# Patient Record
Sex: FEMALE | Race: BLACK OR AFRICAN AMERICAN | NOT HISPANIC OR LATINO | Employment: FULL TIME | URBAN - METROPOLITAN AREA
[De-identification: names, ages, dates, MRNs, and addresses within clinical notes are randomized per-mention and may not be internally consistent; named-entity substitution may affect disease eponyms.]

---

## 2017-12-21 ENCOUNTER — HISTORICAL (OUTPATIENT)
Dept: ADMINISTRATIVE | Facility: HOSPITAL | Age: 32
End: 2017-12-21

## 2017-12-24 LAB — FINAL CULTURE: NORMAL

## 2019-08-16 ENCOUNTER — HISTORICAL (OUTPATIENT)
Dept: RADIOLOGY | Facility: HOSPITAL | Age: 34
End: 2019-08-16

## 2020-07-09 ENCOUNTER — HISTORICAL (OUTPATIENT)
Dept: ADMINISTRATIVE | Facility: HOSPITAL | Age: 35
End: 2020-07-09

## 2021-04-20 LAB — POC BETA-HCG (QUAL): NEGATIVE

## 2022-04-10 ENCOUNTER — HISTORICAL (OUTPATIENT)
Dept: ADMINISTRATIVE | Facility: HOSPITAL | Age: 37
End: 2022-04-10

## 2022-04-11 ENCOUNTER — HISTORICAL (OUTPATIENT)
Dept: ADMINISTRATIVE | Facility: HOSPITAL | Age: 37
End: 2022-04-11

## 2022-04-28 VITALS
SYSTOLIC BLOOD PRESSURE: 122 MMHG | DIASTOLIC BLOOD PRESSURE: 75 MMHG | BODY MASS INDEX: 40.73 KG/M2 | WEIGHT: 176 LBS | HEIGHT: 55 IN

## 2022-04-28 VITALS
WEIGHT: 176 LBS | HEIGHT: 55 IN | DIASTOLIC BLOOD PRESSURE: 75 MMHG | BODY MASS INDEX: 40.73 KG/M2 | SYSTOLIC BLOOD PRESSURE: 122 MMHG

## 2022-09-21 ENCOUNTER — HISTORICAL (OUTPATIENT)
Dept: ADMINISTRATIVE | Facility: HOSPITAL | Age: 37
End: 2022-09-21

## 2023-10-02 DIAGNOSIS — N39.41 URINARY INCONTINENCE, URGE: Primary | ICD-10-CM

## 2023-10-16 ENCOUNTER — OFFICE VISIT (OUTPATIENT)
Dept: UROLOGY | Facility: CLINIC | Age: 38
End: 2023-10-16
Payer: MEDICAID

## 2023-10-16 VITALS
HEART RATE: 80 BPM | HEIGHT: 66 IN | DIASTOLIC BLOOD PRESSURE: 84 MMHG | OXYGEN SATURATION: 100 % | TEMPERATURE: 99 F | WEIGHT: 195 LBS | SYSTOLIC BLOOD PRESSURE: 120 MMHG | RESPIRATION RATE: 20 BRPM | BODY MASS INDEX: 31.34 KG/M2

## 2023-10-16 DIAGNOSIS — N39.41 URINARY INCONTINENCE, URGE: ICD-10-CM

## 2023-10-16 DIAGNOSIS — N39.3 PRIMARY STRESS URINARY INCONTINENCE: ICD-10-CM

## 2023-10-16 DIAGNOSIS — R82.90 ABNORMAL URINALYSIS: Primary | ICD-10-CM

## 2023-10-16 LAB
BILIRUB SERPL-MCNC: NEGATIVE MG/DL
BLOOD URINE, POC: NORMAL
COLOR, POC UA: YELLOW
GLUCOSE UR QL STRIP: NEGATIVE
KETONES UR QL STRIP: NEGATIVE
LEUKOCYTE ESTERASE URINE, POC: NORMAL
NITRITE, POC UA: NEGATIVE
PH, POC UA: 6
POC RESIDUAL URINE VOLUME: 0 ML (ref 0–100)
PROTEIN, POC: NEGATIVE
SPECIFIC GRAVITY, POC UA: 1.03
UROBILINOGEN, POC UA: 0.2

## 2023-10-16 PROCEDURE — 99215 OFFICE O/P EST HI 40 MIN: CPT | Mod: PBBFAC | Performed by: NURSE PRACTITIONER

## 2023-10-16 PROCEDURE — 99204 PR OFFICE/OUTPT VISIT, NEW, LEVL IV, 45-59 MIN: ICD-10-PCS | Mod: S$PBB,,, | Performed by: NURSE PRACTITIONER

## 2023-10-16 PROCEDURE — 1160F RVW MEDS BY RX/DR IN RCRD: CPT | Mod: CPTII,,, | Performed by: NURSE PRACTITIONER

## 2023-10-16 PROCEDURE — 51798 US URINE CAPACITY MEASURE: CPT | Mod: PBBFAC | Performed by: NURSE PRACTITIONER

## 2023-10-16 PROCEDURE — 3079F PR MOST RECENT DIASTOLIC BLOOD PRESSURE 80-89 MM HG: ICD-10-PCS | Mod: CPTII,,, | Performed by: NURSE PRACTITIONER

## 2023-10-16 PROCEDURE — 1159F PR MEDICATION LIST DOCUMENTED IN MEDICAL RECORD: ICD-10-PCS | Mod: CPTII,,, | Performed by: NURSE PRACTITIONER

## 2023-10-16 PROCEDURE — 1159F MED LIST DOCD IN RCRD: CPT | Mod: CPTII,,, | Performed by: NURSE PRACTITIONER

## 2023-10-16 PROCEDURE — 3079F DIAST BP 80-89 MM HG: CPT | Mod: CPTII,,, | Performed by: NURSE PRACTITIONER

## 2023-10-16 PROCEDURE — 3008F PR BODY MASS INDEX (BMI) DOCUMENTED: ICD-10-PCS | Mod: CPTII,,, | Performed by: NURSE PRACTITIONER

## 2023-10-16 PROCEDURE — 3074F SYST BP LT 130 MM HG: CPT | Mod: CPTII,,, | Performed by: NURSE PRACTITIONER

## 2023-10-16 PROCEDURE — 3008F BODY MASS INDEX DOCD: CPT | Mod: CPTII,,, | Performed by: NURSE PRACTITIONER

## 2023-10-16 PROCEDURE — 99204 OFFICE O/P NEW MOD 45 MIN: CPT | Mod: S$PBB,,, | Performed by: NURSE PRACTITIONER

## 2023-10-16 PROCEDURE — 3074F PR MOST RECENT SYSTOLIC BLOOD PRESSURE < 130 MM HG: ICD-10-PCS | Mod: CPTII,,, | Performed by: NURSE PRACTITIONER

## 2023-10-16 PROCEDURE — 1160F PR REVIEW ALL MEDS BY PRESCRIBER/CLIN PHARMACIST DOCUMENTED: ICD-10-PCS | Mod: CPTII,,, | Performed by: NURSE PRACTITIONER

## 2023-10-16 PROCEDURE — 87088 URINE BACTERIA CULTURE: CPT | Performed by: NURSE PRACTITIONER

## 2023-10-16 PROCEDURE — 81001 URINALYSIS AUTO W/SCOPE: CPT | Mod: PBBFAC | Performed by: NURSE PRACTITIONER

## 2023-10-16 RX ORDER — IMIPRAMINE HYDROCHLORIDE 25 MG/1
25 TABLET, FILM COATED ORAL NIGHTLY
Qty: 90 TABLET | Refills: 3 | Status: SHIPPED | OUTPATIENT
Start: 2023-10-16 | End: 2024-10-15

## 2023-10-16 NOTE — PROGRESS NOTES
Placed in room. Seen by Alexandre Mcmahan NP. Spoke with patient. Bladder scan at 0 ml.  F/U in a month.

## 2023-10-16 NOTE — PROGRESS NOTES
Chief Complaint:   Chief Complaint   Patient presents with    urinary incontinece       HPI:  Patient is a 38-year-old female referred to Urology for urinary incontinence.  Patient was started on oxybutynin ER 10 mg daily for urinary incontinence by PCP, she felt it did not work.  Patient having frequent episodes of urinary incontinence associated with coughing, laughing, straining she is going through approximately 3 mild soiled pads per day.  Patient states she does not drink more than 30-40 oz of fluid a day due to less under episodes of incontinence patient feels she gets frequent urinary tract infections due to her highly concentrated urine and lack of water intake.  Today patient denies any dysuria, urinary retention, gross hematuria.  Bladder scan 0 mL.    Allergies:  Review of patient's allergies indicates:   Allergen Reactions    Penicillins Anaphylaxis, Hives and Shortness Of Breath    Iodine     Iodine and iodide containing products     Shellfish derived        Medications:  Current Outpatient Medications   Medication Sig Dispense Refill    albuterol (PROVENTIL/VENTOLIN HFA) 90 mcg/actuation inhaler albuterol sulfate HFA 90 mcg/actuation aerosol inhaler   INHALE 1 PUFF INTO THE LUNGS EVERY 4 TO 6 HOURS AS NEEDED FOR SHORTNESS OF BREATH OR WHEEZING      azelastine (ASTELIN) 137 mcg (0.1 %) nasal spray azelastine 137 mcg (0.1 %) nasal spray aerosol      cholecalciferol, vitamin D3, (DECARA) 1,250 mcg (50,000 unit) capsule Decara 1,250 mcg (50,000 unit) capsule   TAKE 1 SOFTGEL BY MOUTH ONCE A WEEK      crisaborole (EUCRISA) 2 % Oint Eucrisa 2 % topical ointment   APPLY THIN COAT TO AFFECTED AREA TWICE A DAY      diclofenac sodium (VOLTAREN) 1 % Gel diclofenac 1 % topical gel   APPLY TO AFFECTED AREA AS NEEDED FOR PAIN 30      EPINEPHrine (EPIPEN) 0.3 mg/0.3 mL AtIn epinephrine 0.3 mg/0.3 mL injection, auto-injector   INJECT 1 SYRINGE INTO LATERAL THIGH AT ONSET OF ANAPHYLAXIS      fluticasone propionate  (FLONASE) 50 mcg/actuation nasal spray fluticasone propionate 50 mcg/actuation nasal spray,suspension   USE 1 SPRAY IN EACH NOSTRIL ONCE DAILY      fluticasone propionate (FLOVENT HFA) 44 mcg/actuation inhaler Flovent HFA 44 mcg/actuation aerosol inhaler   INHALE 2 PUFFS TWICE A DAY      levocetirizine (XYZAL) 5 MG tablet levocetirizine 5 mg tablet   TAKE 1 TABLET BY MOUTH AT BEDTIME FOR ALLERGIES 30      mometasone 0.1% (ELOCON) 0.1 % cream mometasone 0.1 % topical cream   APPLY SPARINGLY TO AFFECTED AREA EVERY DAY      montelukast (SINGULAIR) 10 mg tablet montelukast 10 mg tablet   TAKE 1 TABLET BY MOUTH EVERY EVENING      omeprazole (PRILOSEC) 40 MG capsule omeprazole 40 mg capsule,delayed release      triamcinolone acetonide 0.1% (KENALOG) 0.1 % cream triamcinolone acetonide 0.1 % topical cream   APPLY THIN COAT TO AFFECTED AREA TWICE A DAY      oxybutynin (DITROPAN-XL) 10 MG 24 hr tablet oxybutynin chloride ER 10 mg tablet,extended release 24 hr   TAKE 1 TABLET BY MOUTH EVERY DAY       No current facility-administered medications for this visit.       Review of Systems:  General: No fever, chills, fatigability, or weight loss.  Skin: No rashes, itching, or changes in color or texture of skin.  Chest: Denies LIZARRAGA, cyanosis, wheezing, cough, and sputum production.  Abdomen: Appetite fine. No weight loss. Denies diarrhea, abdominal pain, hematemesis, or blood in stool.  Musculoskeletal: No joint stiffness or swelling. Denies back pain.  : As above.  All other review of systems negative.    PMH:  Past Medical History:   Diagnosis Date    Allergic rhinitis     Asthma     Excessive cerumen in ear canal     Hearing loss in left ear        PSH:  No past surgical history on file.    FamHx:  Family History   Problem Relation Age of Onset    Diabetes type II Paternal Grandfather     Cervical cancer Maternal Grandmother     Diabetes type II Maternal Grandmother     Colon cancer Father        SocHx:  Social History      Socioeconomic History    Marital status: Single   Tobacco Use    Smoking status: Never     Passive exposure: Never    Smokeless tobacco: Never       Physical Exam:  Vitals:    10/16/23 0849   BP: 120/84   Pulse: 80   Resp: 20   Temp: 99.3 °F (37.4 °C)     General: A&Ox3, no apparent distress, no deformities  Neck: No masses, normal thyroid  Lungs: CTA dana, no use of accessory muscles  Heart: RRR, no arrhythmias  Abdomen: Soft, NT, ND, no masses, no hernias, no hepatosplenomegaly  Lymphatic: Neck and groin nodes negative  Skin: The skin is warm and dry. No jaundice.  Ext: No c/c/e.    Urinalysis:  Color, UA Yellow    Spec Grav UA 1.030    pH, UA 6.0    WBC, UA Small    Nitrite, UA Negative    Protein, POC Negative    Glucose, UA Negative    Ketones, UA Negative    Urobilinogen, UA 0.2    Bilirubin, POC Negative    Blood, UA Trace-intact               Specimen Collected: 10/16/23 09:06         Microscopic urinalysis revealed trace RBCs, negative for nitrites and small WBCs.      Impression:  1. Urinary incontinence, urge  - Ambulatory referral/consult to Urology  - POCT URINE DIPSTICK WITH MICROSCOPE, AUTOMATED      Plan: Instructed patient start imipramine 25 mg p.o. daily RTC 1 month for re-evaluation.  Instructed patient  on timed voiding every 2-3 hrs, double voiding, avoidance of bladder irritants such as alcohol, citrus foods, chocolate, caffeinated drinks, energy drinks, spicy foods, sugar, caffeine products, sodas. Instructed patient to avoid drinking fluids 1-2 hours prior to bedtime & void immediately before bedtime.  Instructed patient to increase water intake to 40-60 oz per day.

## 2023-10-18 LAB — BACTERIA UR CULT: NORMAL

## 2023-11-07 DIAGNOSIS — N39.41 URINARY INCONTINENCE, URGE: Primary | ICD-10-CM

## 2023-11-07 RX ORDER — VIBEGRON 75 MG/1
75 TABLET, FILM COATED ORAL DAILY
Qty: 30 TABLET | Refills: 11 | Status: SHIPPED | OUTPATIENT
Start: 2023-11-07

## 2023-11-08 ENCOUNTER — TELEPHONE (OUTPATIENT)
Dept: UROLOGY | Facility: CLINIC | Age: 38
End: 2023-11-08
Payer: MEDICAID

## 2023-11-08 NOTE — TELEPHONE ENCOUNTER
Called & explained medication changes w/pt stating understanding    ----- Message from Dax Mcmahan NP sent at 11/7/2023  3:27 PM CST -----  Regarding: RE: Medication  Please instruct patient to start Gemtessa 75 mg p.o. daily follow-up appointment in one month, D/C Imipramine  ----- Message -----  From: Marilin Bundy RN  Sent: 11/7/2023   9:49 AM CST  To: Dax Mcmahan NP  Subject: FW: Medication                                   Please advise  ----- Message -----  From: Claire Aj  Sent: 11/7/2023   9:42 AM CST  To: Marilin Bundy RN  Subject: Medication                                       Patient called stating medication Imipramine (TOFRANIL) is not working she is having dry mouth, PHONE NUMBER VERIFIED

## 2023-11-16 ENCOUNTER — OFFICE VISIT (OUTPATIENT)
Dept: UROLOGY | Facility: CLINIC | Age: 38
End: 2023-11-16
Payer: MEDICAID

## 2023-11-16 VITALS
TEMPERATURE: 98 F | BODY MASS INDEX: 30.67 KG/M2 | HEART RATE: 80 BPM | DIASTOLIC BLOOD PRESSURE: 85 MMHG | SYSTOLIC BLOOD PRESSURE: 117 MMHG | WEIGHT: 190.81 LBS | OXYGEN SATURATION: 100 % | RESPIRATION RATE: 18 BRPM | HEIGHT: 66 IN

## 2023-11-16 DIAGNOSIS — R82.90 ABNORMAL URINALYSIS: ICD-10-CM

## 2023-11-16 DIAGNOSIS — N39.41 URINARY INCONTINENCE, URGE: Primary | ICD-10-CM

## 2023-11-16 LAB
BILIRUB SERPL-MCNC: NEGATIVE MG/DL
BLOOD URINE, POC: NEGATIVE
COLOR, POC UA: NORMAL
GLUCOSE UR QL STRIP: NEGATIVE
KETONES UR QL STRIP: NEGATIVE
LEUKOCYTE ESTERASE URINE, POC: NORMAL
NITRITE, POC UA: NEGATIVE
PH, POC UA: 7
PROTEIN, POC: NEGATIVE
SPECIFIC GRAVITY, POC UA: 1.02
UROBILINOGEN, POC UA: 0.2

## 2023-11-16 PROCEDURE — 1159F MED LIST DOCD IN RCRD: CPT | Mod: CPTII,,, | Performed by: NURSE PRACTITIONER

## 2023-11-16 PROCEDURE — 3074F SYST BP LT 130 MM HG: CPT | Mod: CPTII,,, | Performed by: NURSE PRACTITIONER

## 2023-11-16 PROCEDURE — 3079F PR MOST RECENT DIASTOLIC BLOOD PRESSURE 80-89 MM HG: ICD-10-PCS | Mod: CPTII,,, | Performed by: NURSE PRACTITIONER

## 2023-11-16 PROCEDURE — 1159F PR MEDICATION LIST DOCUMENTED IN MEDICAL RECORD: ICD-10-PCS | Mod: CPTII,,, | Performed by: NURSE PRACTITIONER

## 2023-11-16 PROCEDURE — 3008F BODY MASS INDEX DOCD: CPT | Mod: CPTII,,, | Performed by: NURSE PRACTITIONER

## 2023-11-16 PROCEDURE — 99215 OFFICE O/P EST HI 40 MIN: CPT | Mod: PBBFAC | Performed by: NURSE PRACTITIONER

## 2023-11-16 PROCEDURE — 81001 URINALYSIS AUTO W/SCOPE: CPT | Mod: PBBFAC | Performed by: NURSE PRACTITIONER

## 2023-11-16 PROCEDURE — 99213 PR OFFICE/OUTPT VISIT, EST, LEVL III, 20-29 MIN: ICD-10-PCS | Mod: S$PBB,,, | Performed by: NURSE PRACTITIONER

## 2023-11-16 PROCEDURE — 99213 OFFICE O/P EST LOW 20 MIN: CPT | Mod: S$PBB,,, | Performed by: NURSE PRACTITIONER

## 2023-11-16 PROCEDURE — 3074F PR MOST RECENT SYSTOLIC BLOOD PRESSURE < 130 MM HG: ICD-10-PCS | Mod: CPTII,,, | Performed by: NURSE PRACTITIONER

## 2023-11-16 PROCEDURE — 3008F PR BODY MASS INDEX (BMI) DOCUMENTED: ICD-10-PCS | Mod: CPTII,,, | Performed by: NURSE PRACTITIONER

## 2023-11-16 PROCEDURE — 87086 URINE CULTURE/COLONY COUNT: CPT | Performed by: NURSE PRACTITIONER

## 2023-11-16 PROCEDURE — 3079F DIAST BP 80-89 MM HG: CPT | Mod: CPTII,,, | Performed by: NURSE PRACTITIONER

## 2023-11-16 NOTE — PROGRESS NOTES
Chief Complaint:   Chief Complaint   Patient presents with    Follow-up     1 mth F/U       HPI: Patient is a 38-year-old female here for one-month follow-up for urinary incontinence.  Patient was started on oxybutynin ER 10 mg daily for urinary incontinence by PCP, she felt it did not work.  Patient was having frequent episodes of urinary incontinence associated with coughing, laughing, straining she is going through approximately 3 mild soiled pads per day.  Patient states she does not drink more than 30-40 oz of fluid a day due to less under episodes of incontinence patient feels she gets frequent urinary tract infections due to her highly concentrated urine and lack of water intake.  Therefore on patient's last appointment she was placed on imipramine 25 mg p.o. daily.  Today patient presents with much improved symptoms after 1 week of imipramine 25 mg p.o. daily however she is having some urinary frequency today and her urinalysis revealed small WBCs will run urine for culture and sensitivity and notify patient results when completed instructed patient RTC 3 months.    Allergies:  Review of patient's allergies indicates:   Allergen Reactions    Penicillins Anaphylaxis, Hives and Shortness Of Breath    Iodine     Iodine and iodide containing products     Shellfish derived        Medications:  Current Outpatient Medications   Medication Sig Dispense Refill    albuterol (PROVENTIL/VENTOLIN HFA) 90 mcg/actuation inhaler albuterol sulfate HFA 90 mcg/actuation aerosol inhaler   INHALE 1 PUFF INTO THE LUNGS EVERY 4 TO 6 HOURS AS NEEDED FOR SHORTNESS OF BREATH OR WHEEZING      azelastine (ASTELIN) 137 mcg (0.1 %) nasal spray azelastine 137 mcg (0.1 %) nasal spray aerosol      cholecalciferol, vitamin D3, (DECARA) 1,250 mcg (50,000 unit) capsule Decara 1,250 mcg (50,000 unit) capsule   TAKE 1 SOFTGEL BY MOUTH ONCE A WEEK      crisaborole (EUCRISA) 2 % Oint Eucrisa 2 % topical ointment   APPLY THIN COAT TO AFFECTED AREA  TWICE A DAY      diclofenac sodium (VOLTAREN) 1 % Gel diclofenac 1 % topical gel   APPLY TO AFFECTED AREA AS NEEDED FOR PAIN 30      EPINEPHrine (EPIPEN) 0.3 mg/0.3 mL AtIn epinephrine 0.3 mg/0.3 mL injection, auto-injector   INJECT 1 SYRINGE INTO LATERAL THIGH AT ONSET OF ANAPHYLAXIS      fluticasone propionate (FLONASE) 50 mcg/actuation nasal spray fluticasone propionate 50 mcg/actuation nasal spray,suspension   USE 1 SPRAY IN EACH NOSTRIL ONCE DAILY      fluticasone propionate (FLOVENT HFA) 44 mcg/actuation inhaler Flovent HFA 44 mcg/actuation aerosol inhaler   INHALE 2 PUFFS TWICE A DAY      levocetirizine (XYZAL) 5 MG tablet levocetirizine 5 mg tablet   TAKE 1 TABLET BY MOUTH AT BEDTIME FOR ALLERGIES 30      mometasone 0.1% (ELOCON) 0.1 % cream mometasone 0.1 % topical cream   APPLY SPARINGLY TO AFFECTED AREA EVERY DAY      montelukast (SINGULAIR) 10 mg tablet montelukast 10 mg tablet   TAKE 1 TABLET BY MOUTH EVERY EVENING      omeprazole (PRILOSEC) 40 MG capsule omeprazole 40 mg capsule,delayed release      oxybutynin (DITROPAN-XL) 10 MG 24 hr tablet oxybutynin chloride ER 10 mg tablet,extended release 24 hr   TAKE 1 TABLET BY MOUTH EVERY DAY      triamcinolone acetonide 0.1% (KENALOG) 0.1 % cream triamcinolone acetonide 0.1 % topical cream   APPLY THIN COAT TO AFFECTED AREA TWICE A DAY      vibegron (GEMTESA) 75 mg Tab Take 75 mg by mouth once daily. 30 tablet 11    imipramine (TOFRANIL) 25 MG tablet Take 1 tablet (25 mg total) by mouth every evening. (Patient not taking: Reported on 11/16/2023) 90 tablet 3     No current facility-administered medications for this visit.       Review of Systems:  General: No fever, chills, fatigability, or weight loss.  Skin: No rashes, itching, or changes in color or texture of skin.  Chest: Denies LIZARRAGA, cyanosis, wheezing, cough, and sputum production.  Abdomen: Appetite fine. No weight loss. Denies diarrhea, abdominal pain, hematemesis, or blood in stool.  Musculoskeletal:  No joint stiffness or swelling. Denies back pain.  : As above.  All other review of systems negative.    PMH:  Past Medical History:   Diagnosis Date    Allergic rhinitis     Asthma     Excessive cerumen in ear canal     Hearing loss in left ear        PSH:  History reviewed. No pertinent surgical history.    FamHx:  Family History   Problem Relation Age of Onset    Diabetes type II Paternal Grandfather     Cervical cancer Maternal Grandmother     Diabetes type II Maternal Grandmother     Colon cancer Father        SocHx:  Social History     Socioeconomic History    Marital status: Single   Tobacco Use    Smoking status: Never     Passive exposure: Never    Smokeless tobacco: Never       Physical Exam:  Vitals:    11/16/23 0909   BP: 117/85   Pulse: 80   Resp: 18   Temp: 98.2 °F (36.8 °C)     General: A&Ox3, no apparent distress, no deformities  Neck: No masses, normal thyroid  Lungs: CTA dana, no use of accessory muscles  Heart: RRR, no arrhythmias  Abdomen: Soft, NT, ND, no masses, no hernias, no hepatosplenomegaly  Lymphatic: Neck and groin nodes negative  Skin: The skin is warm and dry. No jaundice.  Ext: No c/c/e.      Urinalysis:  Results for orders placed or performed in visit on 10/16/23   POCT URINE DIPSTICK WITH MICROSCOPE, AUTOMATED   Result Value Ref Range    Color, UA Yellow     Spec Grav UA 1.030     pH, UA 6.0     WBC, UA Small     Nitrite, UA Negative     Protein, POC Negative     Glucose, UA Negative     Ketones, UA Negative     Urobilinogen, UA 0.2     Bilirubin, POC Negative     Blood, UA Trace-intact    Microscopic urinalysis revealed trace RBCs, small WBCs, negative nitrites.          Impression:  Stress Urinary incontinence  There are no diagnoses linked to this encounter.    Plan: Instructed patient continue imipramine 25 mg p.o. daily on timed voiding every 2-3 hrs, double voiding, avoidance of bladder irritants such as alcohol, citrus foods, chocolate, caffeinated drinks, energy drinks,  spicy foods, sugar, caffeine products, sodas. Instructed patient to avoid drinking fluids 1-2 hours prior to bedtime & void immediately before bedtime.  Instructed patient will run a urine culture and sensitivity and notify patient of results when completed and treat accordingly.  RTC three months.

## 2023-11-18 LAB — BACTERIA UR CULT: NO GROWTH

## 2024-02-19 ENCOUNTER — OFFICE VISIT (OUTPATIENT)
Dept: UROLOGY | Facility: CLINIC | Age: 39
End: 2024-02-19
Payer: MEDICAID

## 2024-02-19 VITALS
TEMPERATURE: 99 F | SYSTOLIC BLOOD PRESSURE: 115 MMHG | BODY MASS INDEX: 31.08 KG/M2 | HEART RATE: 87 BPM | WEIGHT: 193.38 LBS | OXYGEN SATURATION: 100 % | DIASTOLIC BLOOD PRESSURE: 82 MMHG | HEIGHT: 66 IN

## 2024-02-19 DIAGNOSIS — N39.41 URINARY INCONTINENCE, URGE: Primary | ICD-10-CM

## 2024-02-19 LAB
BILIRUB SERPL-MCNC: NORMAL MG/DL
BLOOD URINE, POC: NORMAL
COLOR, POC UA: YELLOW
GLUCOSE UR QL STRIP: NORMAL
KETONES UR QL STRIP: NORMAL
LEUKOCYTE ESTERASE URINE, POC: NORMAL
NITRITE, POC UA: NORMAL
PH, POC UA: 6
PROTEIN, POC: 30
SPECIFIC GRAVITY, POC UA: >=1.03
UROBILINOGEN, POC UA: 1

## 2024-02-19 PROCEDURE — 81001 URINALYSIS AUTO W/SCOPE: CPT | Mod: PBBFAC | Performed by: NURSE PRACTITIONER

## 2024-02-19 PROCEDURE — 3008F BODY MASS INDEX DOCD: CPT | Mod: CPTII,,, | Performed by: NURSE PRACTITIONER

## 2024-02-19 PROCEDURE — 3074F SYST BP LT 130 MM HG: CPT | Mod: CPTII,,, | Performed by: NURSE PRACTITIONER

## 2024-02-19 PROCEDURE — 1160F RVW MEDS BY RX/DR IN RCRD: CPT | Mod: CPTII,,, | Performed by: NURSE PRACTITIONER

## 2024-02-19 PROCEDURE — 1159F MED LIST DOCD IN RCRD: CPT | Mod: CPTII,,, | Performed by: NURSE PRACTITIONER

## 2024-02-19 PROCEDURE — 99213 OFFICE O/P EST LOW 20 MIN: CPT | Mod: S$PBB,,, | Performed by: NURSE PRACTITIONER

## 2024-02-19 PROCEDURE — 3079F DIAST BP 80-89 MM HG: CPT | Mod: CPTII,,, | Performed by: NURSE PRACTITIONER

## 2024-02-19 PROCEDURE — 99215 OFFICE O/P EST HI 40 MIN: CPT | Mod: PBBFAC | Performed by: NURSE PRACTITIONER

## 2024-02-19 NOTE — PROGRESS NOTES
Chief Complaint:   Chief Complaint   Patient presents with    Urinary Incontinence         HPI:  Patient is a 38-year-old female here for 4 month follow-up for urinary incontinence.  Patient was previously on oxybutynin ER 10 mg daily for urinary incontinence by PCP, she felt it did not work.  Patient having frequent episodes of urinary incontinence associated with coughing, laughing, straining she is going through approximately 3 mild soiled pads per day.  Patient was started on Gemtesa 75 mg p.o. daily on last office visit.  Today patient presents with much improved symptoms of urinary frequency, urgency, associated with coughing, laughing, straining she no longer wears briefs due to incontinence, denies nocturia, she however does deny any dysuria, urinary incontinence, urinary retention, gross hematuria.    Allergies:  Review of patient's allergies indicates:   Allergen Reactions    Penicillins Anaphylaxis, Hives and Shortness Of Breath    Iodine     Iodine and iodide containing products     Shellfish derived        Medications:  Current Outpatient Medications   Medication Sig Dispense Refill    albuterol (PROVENTIL/VENTOLIN HFA) 90 mcg/actuation inhaler albuterol sulfate HFA 90 mcg/actuation aerosol inhaler   INHALE 1 PUFF INTO THE LUNGS EVERY 4 TO 6 HOURS AS NEEDED FOR SHORTNESS OF BREATH OR WHEEZING      azelastine (ASTELIN) 137 mcg (0.1 %) nasal spray azelastine 137 mcg (0.1 %) nasal spray aerosol      cholecalciferol, vitamin D3, (DECARA) 1,250 mcg (50,000 unit) capsule Decara 1,250 mcg (50,000 unit) capsule   TAKE 1 SOFTGEL BY MOUTH ONCE A WEEK      diclofenac sodium (VOLTAREN) 1 % Gel diclofenac 1 % topical gel   APPLY TO AFFECTED AREA AS NEEDED FOR PAIN 30      EPINEPHrine (EPIPEN) 0.3 mg/0.3 mL AtIn epinephrine 0.3 mg/0.3 mL injection, auto-injector   INJECT 1 SYRINGE INTO LATERAL THIGH AT ONSET OF ANAPHYLAXIS      fluticasone propionate (FLONASE) 50 mcg/actuation nasal spray fluticasone propionate 50  mcg/actuation nasal spray,suspension   USE 1 SPRAY IN EACH NOSTRIL ONCE DAILY      fluticasone propionate (FLOVENT HFA) 44 mcg/actuation inhaler Flovent HFA 44 mcg/actuation aerosol inhaler   INHALE 2 PUFFS TWICE A DAY      levocetirizine (XYZAL) 5 MG tablet levocetirizine 5 mg tablet   TAKE 1 TABLET BY MOUTH AT BEDTIME FOR ALLERGIES 30      montelukast (SINGULAIR) 10 mg tablet montelukast 10 mg tablet   TAKE 1 TABLET BY MOUTH EVERY EVENING      omeprazole (PRILOSEC) 40 MG capsule omeprazole 40 mg capsule,delayed release      triamcinolone acetonide 0.1% (KENALOG) 0.1 % cream triamcinolone acetonide 0.1 % topical cream   APPLY THIN COAT TO AFFECTED AREA TWICE A DAY      vibegron (GEMTESA) 75 mg Tab Take 75 mg by mouth once daily. 30 tablet 11    crisaborole (EUCRISA) 2 % Oint Eucrisa 2 % topical ointment   APPLY THIN COAT TO AFFECTED AREA TWICE A DAY      imipramine (TOFRANIL) 25 MG tablet Take 1 tablet (25 mg total) by mouth every evening. (Patient not taking: Reported on 11/16/2023) 90 tablet 3    mometasone 0.1% (ELOCON) 0.1 % cream mometasone 0.1 % topical cream   APPLY SPARINGLY TO AFFECTED AREA EVERY DAY      oxybutynin (DITROPAN-XL) 10 MG 24 hr tablet oxybutynin chloride ER 10 mg tablet,extended release 24 hr   TAKE 1 TABLET BY MOUTH EVERY DAY       No current facility-administered medications for this visit.       Review of Systems:  General: No fever, chills, fatigability, or weight loss.  Skin: No rashes, itching, or changes in color or texture of skin.  Chest: Denies LIZARRAGA, cyanosis, wheezing, cough, and sputum production.  Abdomen: Appetite fine. No weight loss. Denies diarrhea, abdominal pain, hematemesis, or blood in stool.  Musculoskeletal: No joint stiffness or swelling. Denies back pain.  : As above.  All other review of systems negative.    PMH:  Past Medical History:   Diagnosis Date    Allergic rhinitis     Asthma     Excessive cerumen in ear canal     Hearing loss in left ear        PSH:  No  past surgical history on file.    FamHx:  Family History   Problem Relation Age of Onset    Diabetes type II Paternal Grandfather     Cervical cancer Maternal Grandmother     Diabetes type II Maternal Grandmother     Colon cancer Father        SocHx:  Social History     Socioeconomic History    Marital status: Single   Tobacco Use    Smoking status: Never     Passive exposure: Never    Smokeless tobacco: Never       Physical Exam:  Vitals:    02/19/24 1313   BP: 115/82   Pulse: 87   Temp: 98.8 °F (37.1 °C)     General: A&Ox3, no apparent distress, no deformities  Neck: No masses, normal thyroid  Lungs: CTA dana, no use of accessory muscles  Heart: RRR, no arrhythmias  Abdomen: Soft, NT, ND, no masses, no hernias, no hepatosplenomegaly  Lymphatic: Neck and groin nodes negative  Skin: The skin is warm and dry. No jaundice.  Ext: No c/c/e.         Urinalysis:      Component 13:29   Color, UA Yellow   Spec Grav UA >=1.030   pH, UA 6.0   WBC, UA neg   Nitrite, UA neg   Protein, POC 30   Glucose, UA neg   Ketones, UA neg   Urobilinogen, UA 1.0   Bilirubin, POC neg   Blood, UA large      Microscopic urinalysis revealed negative WBCs, nitrites, however large RBCs due to menstrual cycle.        Impression:  1. Urinary incontinence, urge  - POCT URINE DIPSTICK WITH MICROSCOPE, AUTOMATED      Plan: Instructed patient continue Gemtesa 75 mg p.o. daily, Kegel exercise for bladder strengthening, re-emphasized patient teaching on timed voiding every 2-3 hrs, double voiding, avoidance of bladder irritants such as alcohol, citrus foods, chocolate, caffeinated drinks, energy drinks, spicy foods, sugar, caffeine products, sodas. Instructed patient to avoid drinking fluids 1-2 hours prior to bedtime & void immediately before bedtime.  RTC 6 months for re-evaluation.

## 2024-07-06 DIAGNOSIS — N39.41 URINARY INCONTINENCE, URGE: ICD-10-CM

## 2024-07-08 RX ORDER — SOLIFENACIN SUCCINATE 5 MG/1
5 TABLET, FILM COATED ORAL DAILY
Qty: 30 TABLET | Refills: 1 | Status: SHIPPED | OUTPATIENT
Start: 2024-07-08

## 2024-08-19 ENCOUNTER — OFFICE VISIT (OUTPATIENT)
Dept: UROLOGY | Facility: CLINIC | Age: 39
End: 2024-08-19
Payer: COMMERCIAL

## 2024-08-19 VITALS
HEART RATE: 65 BPM | SYSTOLIC BLOOD PRESSURE: 121 MMHG | DIASTOLIC BLOOD PRESSURE: 81 MMHG | RESPIRATION RATE: 18 BRPM | WEIGHT: 195 LBS | HEIGHT: 66 IN | BODY MASS INDEX: 31.34 KG/M2 | OXYGEN SATURATION: 100 %

## 2024-08-19 DIAGNOSIS — N39.41 URINARY INCONTINENCE, URGE: Primary | ICD-10-CM

## 2024-08-19 PROCEDURE — 3074F SYST BP LT 130 MM HG: CPT | Mod: CPTII,,, | Performed by: NURSE PRACTITIONER

## 2024-08-19 PROCEDURE — 3079F DIAST BP 80-89 MM HG: CPT | Mod: CPTII,,, | Performed by: NURSE PRACTITIONER

## 2024-08-19 PROCEDURE — 3008F BODY MASS INDEX DOCD: CPT | Mod: CPTII,,, | Performed by: NURSE PRACTITIONER

## 2024-08-19 PROCEDURE — 99214 OFFICE O/P EST MOD 30 MIN: CPT | Mod: PBBFAC | Performed by: NURSE PRACTITIONER

## 2024-08-19 PROCEDURE — 99213 OFFICE O/P EST LOW 20 MIN: CPT | Mod: S$PBB,,, | Performed by: NURSE PRACTITIONER

## 2024-08-19 PROCEDURE — 1159F MED LIST DOCD IN RCRD: CPT | Mod: CPTII,,, | Performed by: NURSE PRACTITIONER

## 2024-08-19 PROCEDURE — 1160F RVW MEDS BY RX/DR IN RCRD: CPT | Mod: CPTII,,, | Performed by: NURSE PRACTITIONER

## 2024-08-19 RX ORDER — VIBEGRON 75 MG/1
1 TABLET, FILM COATED ORAL
COMMUNITY
Start: 2024-08-04 | End: 2024-08-19 | Stop reason: SDUPTHER

## 2024-08-19 RX ORDER — VIBEGRON 75 MG/1
1 TABLET, FILM COATED ORAL DAILY
Qty: 30 TABLET | Refills: 11 | Status: SHIPPED | OUTPATIENT
Start: 2024-08-19

## 2024-08-19 NOTE — PROGRESS NOTES
Chief Complaint:   Chief Complaint   Patient presents with    Follow-up     6 month follow up Stress, Urinary Incontinence; Last visit: 02/19/2024       HPI:    Patient is a 38-year-old female here for 4 month follow-up for urinary incontinence.    Patient was previously on oxybutynin ER 10 mg daily for urinary incontinence by PCP, she felt it did not work.   Patient having frequent episodes of urinary incontinence associated with coughing, laughing, straining she is going through approximately 3 mild soiled pads per day.   Patient was started on Gemtesa 75 mg p.o. daily on last office visit.        Allergies:  Review of patient's allergies indicates:   Allergen Reactions    Penicillins Anaphylaxis, Hives and Shortness Of Breath    Iodine     Iodine and iodide containing products     Shellfish derived        Medications:  Current Outpatient Medications   Medication Sig Dispense Refill    albuterol (PROVENTIL/VENTOLIN HFA) 90 mcg/actuation inhaler albuterol sulfate HFA 90 mcg/actuation aerosol inhaler   INHALE 1 PUFF INTO THE LUNGS EVERY 4 TO 6 HOURS AS NEEDED FOR SHORTNESS OF BREATH OR WHEEZING      fluticasone propionate (FLONASE) 50 mcg/actuation nasal spray fluticasone propionate 50 mcg/actuation nasal spray,suspension   USE 1 SPRAY IN EACH NOSTRIL ONCE DAILY      fluticasone propionate (FLOVENT HFA) 44 mcg/actuation inhaler Flovent HFA 44 mcg/actuation aerosol inhaler   INHALE 2 PUFFS TWICE A DAY      GEMTESA 75 mg Tab Take 1 tablet by mouth.      levocetirizine (XYZAL) 5 MG tablet levocetirizine 5 mg tablet   TAKE 1 TABLET BY MOUTH AT BEDTIME FOR ALLERGIES 30      montelukast (SINGULAIR) 10 mg tablet montelukast 10 mg tablet   TAKE 1 TABLET BY MOUTH EVERY EVENING      azelastine (ASTELIN) 137 mcg (0.1 %) nasal spray azelastine 137 mcg (0.1 %) nasal spray aerosol (Patient not taking: Reported on 8/19/2024)      cholecalciferol, vitamin D3, (DECARA) 1,250 mcg (50,000 unit) capsule Decara 1,250 mcg (50,000 unit)  capsule   TAKE 1 SOFTGEL BY MOUTH ONCE A WEEK (Patient not taking: Reported on 8/19/2024)      crisaborole (EUCRISA) 2 % Oint Eucrisa 2 % topical ointment   APPLY THIN COAT TO AFFECTED AREA TWICE A DAY (Patient not taking: Reported on 8/19/2024)      diclofenac sodium (VOLTAREN) 1 % Gel diclofenac 1 % topical gel   APPLY TO AFFECTED AREA AS NEEDED FOR PAIN 30 (Patient not taking: Reported on 8/19/2024)      EPINEPHrine (EPIPEN) 0.3 mg/0.3 mL AtIn epinephrine 0.3 mg/0.3 mL injection, auto-injector   INJECT 1 SYRINGE INTO LATERAL THIGH AT ONSET OF ANAPHYLAXIS (Patient not taking: Reported on 8/19/2024)      imipramine (TOFRANIL) 25 MG tablet Take 1 tablet (25 mg total) by mouth every evening. (Patient not taking: Reported on 11/16/2023) 90 tablet 3    mometasone 0.1% (ELOCON) 0.1 % cream mometasone 0.1 % topical cream   APPLY SPARINGLY TO AFFECTED AREA EVERY DAY (Patient not taking: Reported on 8/19/2024)      omeprazole (PRILOSEC) 40 MG capsule omeprazole 40 mg capsule,delayed release (Patient not taking: Reported on 8/19/2024)      oxybutynin (DITROPAN-XL) 10 MG 24 hr tablet oxybutynin chloride ER 10 mg tablet,extended release 24 hr   TAKE 1 TABLET BY MOUTH EVERY DAY (Patient not taking: Reported on 8/19/2024)      solifenacin (VESICARE) 5 MG tablet Take 1 tablet (5 mg total) by mouth once daily. (Patient not taking: Reported on 8/19/2024) 30 tablet 1    triamcinolone acetonide 0.1% (KENALOG) 0.1 % cream triamcinolone acetonide 0.1 % topical cream   APPLY THIN COAT TO AFFECTED AREA TWICE A DAY (Patient not taking: Reported on 8/19/2024)       No current facility-administered medications for this visit.       Review of Systems:  General: No fever, chills, fatigability, or weight loss.  Skin: No rashes, itching, or changes in color or texture of skin.  Chest: Denies LIZARRAGA, cyanosis, wheezing, cough, and sputum production.  Abdomen: Appetite fine. No weight loss. Denies diarrhea, abdominal pain, hematemesis, or blood in  stool.  Musculoskeletal: No joint stiffness or swelling. Denies back pain.  : As above.  All other review of systems negative.    PMH:  Past Medical History:   Diagnosis Date    Allergic rhinitis     Asthma     Excessive cerumen in ear canal     Hearing loss in left ear        PSH:  No past surgical history on file.    FamHx:  Family History   Problem Relation Name Age of Onset    Diabetes type II Paternal Grandfather      Cervical cancer Maternal Grandmother      Diabetes type II Maternal Grandmother      Colon cancer Father         SocHx:  Social History     Socioeconomic History    Marital status: Single   Tobacco Use    Smoking status: Never     Passive exposure: Never    Smokeless tobacco: Never       Physical Exam:  Vitals:    08/19/24 1341   BP: 121/81   Pulse: 65   Resp: 18     General: A&Ox3, no apparent distress, no deformities  Neck: No masses, normal thyroid  Lungs: CTA dana, no use of accessory muscles  Heart: RRR, no arrhythmias  Abdomen: Soft, NT, ND, no masses, no hernias, no hepatosplenomegaly  Lymphatic: Neck and groin nodes negative  Skin: The skin is warm and dry. No jaundice.  Ext: No c/c/e.      Urinalysis:  Results for orders placed or performed in visit on 02/19/24   POCT URINE DIPSTICK WITH MICROSCOPE, AUTOMATED   Result Value Ref Range    Color, UA Yellow     Spec Grav UA >=1.030     pH, UA 6.0     WBC, UA neg     Nitrite, UA neg     Protein, POC 30     Glucose, UA neg     Ketones, UA neg     Urobilinogen, UA 1.0     Bilirubin, POC neg     Blood, UA large          Impression:  1. Urinary incontinence, urge  - POCT URINE DIPSTICK WITH MICROSCOPE, AUTOMATED      Plan:  Instructed patient to continue Gemtesa 75 mg p.o. daily.  Instructed patient on timed voiding every 2-3 hrs, double voiding, avoidance of bladder irritants such as alcohol, citrus foods, chocolate, caffeinated drinks, energy drinks, spicy foods, sugar, caffeine products, sodas. Instructed patient to avoid drinking fluids 1-2  hours prior to bedtime & void immediately before bedtime.   RTC 6 months.  Instructed patient if develops any abnormal urologic symptoms notify clinic to be re-evaluate treated or during after hours go to emergency room versus urgent here.                           F

## 2024-11-20 DIAGNOSIS — N39.3 PRIMARY STRESS URINARY INCONTINENCE: Primary | ICD-10-CM

## 2024-11-20 DIAGNOSIS — N39.3 PRIMARY STRESS URINARY INCONTINENCE: ICD-10-CM

## 2024-11-20 DIAGNOSIS — N39.41 URINARY INCONTINENCE, URGE: ICD-10-CM

## 2024-11-20 RX ORDER — SOLIFENACIN SUCCINATE 5 MG/1
5 TABLET, FILM COATED ORAL DAILY
Qty: 90 TABLET | Refills: 3 | Status: SHIPPED | OUTPATIENT
Start: 2024-11-20 | End: 2024-11-20

## 2024-11-20 RX ORDER — VIBEGRON 75 MG/1
1 TABLET, FILM COATED ORAL
Qty: 90 TABLET | Refills: 3 | Status: SHIPPED | OUTPATIENT
Start: 2024-11-20 | End: 2024-11-20

## 2024-11-20 RX ORDER — VIBEGRON 75 MG/1
75 TABLET, FILM COATED ORAL DAILY
Qty: 90 TABLET | Refills: 3 | Status: SHIPPED | OUTPATIENT
Start: 2024-11-20 | End: 2024-11-21

## 2024-11-21 RX ORDER — SOLIFENACIN SUCCINATE 5 MG/1
5 TABLET, FILM COATED ORAL DAILY
Qty: 90 TABLET | Refills: 3 | Status: SHIPPED | OUTPATIENT
Start: 2024-11-21

## 2024-11-26 DIAGNOSIS — N39.3 PRIMARY STRESS URINARY INCONTINENCE: ICD-10-CM

## 2024-11-26 DIAGNOSIS — N39.3 PRIMARY STRESS URINARY INCONTINENCE: Primary | ICD-10-CM

## 2024-11-26 RX ORDER — SOLIFENACIN SUCCINATE 5 MG/1
5 TABLET, FILM COATED ORAL DAILY
Qty: 30 TABLET | Refills: 11 | Status: SHIPPED | OUTPATIENT
Start: 2024-11-26

## 2024-11-26 RX ORDER — VIBEGRON 75 MG/1
75 TABLET, FILM COATED ORAL DAILY
Qty: 30 TABLET | Refills: 11 | Status: SHIPPED | OUTPATIENT
Start: 2024-11-26 | End: 2024-11-26